# Patient Record
Sex: MALE | Race: WHITE
[De-identification: names, ages, dates, MRNs, and addresses within clinical notes are randomized per-mention and may not be internally consistent; named-entity substitution may affect disease eponyms.]

---

## 2017-02-16 ENCOUNTER — HOSPITAL ENCOUNTER (OUTPATIENT)
Dept: HOSPITAL 61 - PCVCIMAG | Age: 72
Discharge: HOME | End: 2017-02-16
Attending: INTERNAL MEDICINE
Payer: MEDICARE

## 2017-02-16 DIAGNOSIS — E78.00: ICD-10-CM

## 2017-02-16 DIAGNOSIS — I73.9: ICD-10-CM

## 2017-02-16 DIAGNOSIS — G47.33: ICD-10-CM

## 2017-02-16 DIAGNOSIS — I10: Primary | ICD-10-CM

## 2017-02-16 DIAGNOSIS — Z96.0: ICD-10-CM

## 2017-02-16 DIAGNOSIS — I25.10: ICD-10-CM

## 2017-02-16 DIAGNOSIS — I77.9: ICD-10-CM

## 2017-02-16 DIAGNOSIS — J44.9: ICD-10-CM

## 2017-02-16 DIAGNOSIS — I87.2: ICD-10-CM

## 2017-02-16 PROCEDURE — 76770 US EXAM ABDO BACK WALL COMP: CPT

## 2017-02-16 PROCEDURE — 93975 VASCULAR STUDY: CPT

## 2017-02-16 PROCEDURE — G0463 HOSPITAL OUTPT CLINIC VISIT: HCPCS

## 2017-02-16 PROCEDURE — 93005 ELECTROCARDIOGRAM TRACING: CPT

## 2017-09-25 ENCOUNTER — HOSPITAL ENCOUNTER (OUTPATIENT)
Dept: HOSPITAL 61 - PCVCIMAG | Age: 72
Discharge: HOME | End: 2017-09-25
Attending: INTERNAL MEDICINE
Payer: MEDICARE

## 2017-09-25 DIAGNOSIS — N18.9: ICD-10-CM

## 2017-09-25 DIAGNOSIS — Z79.82: ICD-10-CM

## 2017-09-25 DIAGNOSIS — I70.202: Primary | ICD-10-CM

## 2017-09-25 DIAGNOSIS — I77.9: ICD-10-CM

## 2017-09-25 DIAGNOSIS — J44.9: ICD-10-CM

## 2017-09-25 DIAGNOSIS — G89.29: ICD-10-CM

## 2017-09-25 DIAGNOSIS — Z96.0: ICD-10-CM

## 2017-09-25 DIAGNOSIS — I25.10: ICD-10-CM

## 2017-09-25 DIAGNOSIS — Z88.8: ICD-10-CM

## 2017-09-25 DIAGNOSIS — I44.0: ICD-10-CM

## 2017-09-25 DIAGNOSIS — M54.5: ICD-10-CM

## 2017-09-25 DIAGNOSIS — I87.2: ICD-10-CM

## 2017-09-25 DIAGNOSIS — Z87.891: ICD-10-CM

## 2017-09-25 DIAGNOSIS — Z95.828: ICD-10-CM

## 2017-09-25 DIAGNOSIS — I12.9: ICD-10-CM

## 2017-09-25 DIAGNOSIS — I70.291: ICD-10-CM

## 2017-09-25 DIAGNOSIS — Z79.899: ICD-10-CM

## 2017-09-25 PROCEDURE — 80061 LIPID PANEL: CPT

## 2017-09-25 PROCEDURE — G0463 HOSPITAL OUTPT CLINIC VISIT: HCPCS

## 2017-09-25 PROCEDURE — 93975 VASCULAR STUDY: CPT

## 2017-09-25 PROCEDURE — 93925 LOWER EXTREMITY STUDY: CPT

## 2017-09-25 PROCEDURE — 76770 US EXAM ABDO BACK WALL COMP: CPT

## 2017-09-25 PROCEDURE — 93005 ELECTROCARDIOGRAM TRACING: CPT

## 2017-09-25 NOTE — PCVCIMAG
EXAM: BILATERAL RENAL ULTRASOUND AND BILATERAL RENAL DUPLEX



INDICATION: Hypertension



FINDINGS: 

Right kidney: Length measures 10.9 cm. No hydronephrosis or extensive

renal scarring.



Right renal duplex: Adequate technical quality. No sonographic

evidence of renal artery stenosis. The aortic to renal artery ratio is

2.2. The renal vein is patent.



Left kidney: Length measures 12.1 cm. No hydronephrosis or extensive

renal scarring.



Left renal duplex: Adequate technical quality. No sonographic evidence

of renal artery stenosis. The aortic to renal artery ratio is 2.0. The

renal vein is patent.



Bladder: No obvious abnormalities.



IMPRESSION: 

No significant renal artery stenosis.

No hydronephrosis bilaterally.

Previous left renal artery stent maintaining satisfactory patency.



LOC:[id number]

## 2017-09-25 NOTE — PCVCIMAG
EXAM: BILATERAL LOWER EXTREMITY ARTERIAL DUPLEX



INDICATION: Peripheral Arterial Disease. Leg pain.



FINDINGS: 

Right Leg: Satisfactory arterial waveforms in the common femoral and

profunda femoral artery. Elevated systolic velocity at the origin of

the superficial femoral artery of 268 cm/s consistent with 50-60%

stenosis is unchanged. Beyond this a stent is present throughout much

of the length of superficial femoral artery and shows good patency.

The popliteal artery is patent. The anterior tibial, peroneal, and

posterior tibial arteries are patent.    



Left Leg:  Satisfactory arterial waveforms in the common femoral and

profunda femoral artery. Moderate velocity elevation mid/distal native

superficial femoral artery of 287 cm/s consistent with 60-70%

stenosis. Popliteal artery is patent. The anterior tibial, peroneal,

posterior tibial arteries are patent.    



IMPRESSION: 

50-60% stenosis native proximal right superficial femoral artery.

Previous stent throughout the right superficial femoral artery showing

good patency.

60-70% stenosis mid/distal native left superficial femoral artery.



LOC:[id number]

## 2018-04-03 ENCOUNTER — HOSPITAL ENCOUNTER (OUTPATIENT)
Dept: HOSPITAL 61 - PCVCIMAG | Age: 73
Discharge: HOME | End: 2018-04-03
Attending: INTERNAL MEDICINE
Payer: MEDICARE
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
## 2018-04-03 DIAGNOSIS — Z87.891: ICD-10-CM

## 2018-04-03 DIAGNOSIS — E78.00: ICD-10-CM

## 2018-04-03 DIAGNOSIS — I65.23: Primary | ICD-10-CM

## 2018-04-03 DIAGNOSIS — I73.9: ICD-10-CM

## 2018-04-03 DIAGNOSIS — I25.118: ICD-10-CM

## 2018-04-03 DIAGNOSIS — G47.33: ICD-10-CM
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
## 2018-04-03 DIAGNOSIS — S00.83XA: ICD-10-CM

## 2018-04-03 DIAGNOSIS — I10: ICD-10-CM

## 2018-04-03 DIAGNOSIS — R06.02: ICD-10-CM

## 2018-04-03 DIAGNOSIS — Z79.82: ICD-10-CM
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
## 2018-04-03 DIAGNOSIS — R07.89: ICD-10-CM

## 2018-04-03 DIAGNOSIS — Z79.899: ICD-10-CM
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
## 2018-04-03 PROCEDURE — 93005 ELECTROCARDIOGRAM TRACING: CPT

## 2018-04-03 PROCEDURE — 80061 LIPID PANEL: CPT

## 2018-04-03 PROCEDURE — 36415 COLL VENOUS BLD VENIPUNCTURE: CPT

## 2018-04-03 PROCEDURE — 93925 LOWER EXTREMITY STUDY: CPT

## 2018-04-03 PROCEDURE — 93880 EXTRACRANIAL BILAT STUDY: CPT

## 2018-04-12 ENCOUNTER — HOSPITAL ENCOUNTER (OUTPATIENT)
Dept: HOSPITAL 61 - PCVCIMAG | Age: 73
Discharge: HOME | End: 2018-04-12
Attending: INTERNAL MEDICINE
Payer: MEDICARE

## 2018-04-12 DIAGNOSIS — E11.9: ICD-10-CM

## 2018-04-12 DIAGNOSIS — I10: ICD-10-CM

## 2018-04-12 DIAGNOSIS — I70.213: ICD-10-CM

## 2018-04-12 DIAGNOSIS — J44.9: ICD-10-CM

## 2018-04-12 DIAGNOSIS — I25.10: Primary | ICD-10-CM

## 2018-04-12 DIAGNOSIS — Z87.891: ICD-10-CM

## 2018-04-12 PROCEDURE — 93458 L HRT ARTERY/VENTRICLE ANGIO: CPT

## 2018-04-12 PROCEDURE — 37186 SEC ART THROMBECTOMY ADD-ON: CPT

## 2018-04-12 PROCEDURE — 36252 INS CATH REN ART 1ST BILAT: CPT

## 2018-04-12 PROCEDURE — 37227: CPT

## 2018-04-12 PROCEDURE — 99152 MOD SED SAME PHYS/QHP 5/>YRS: CPT

## 2018-04-12 PROCEDURE — 75716 ARTERY X-RAYS ARMS/LEGS: CPT

## 2018-04-12 PROCEDURE — 99153 MOD SED SAME PHYS/QHP EA: CPT

## 2018-07-31 ENCOUNTER — HOSPITAL ENCOUNTER (OUTPATIENT)
Dept: HOSPITAL 61 - PCVCIMAG | Age: 73
Discharge: HOME | End: 2018-07-31
Attending: RADIOLOGY
Payer: MEDICARE

## 2018-07-31 DIAGNOSIS — E78.00: ICD-10-CM

## 2018-07-31 DIAGNOSIS — I73.9: Primary | ICD-10-CM

## 2018-07-31 DIAGNOSIS — I87.2: ICD-10-CM

## 2018-07-31 DIAGNOSIS — Z87.891: ICD-10-CM

## 2018-07-31 DIAGNOSIS — I25.10: ICD-10-CM

## 2018-07-31 DIAGNOSIS — Z79.82: ICD-10-CM

## 2018-07-31 DIAGNOSIS — Z79.899: ICD-10-CM

## 2018-07-31 DIAGNOSIS — I77.9: ICD-10-CM

## 2018-07-31 DIAGNOSIS — J44.9: ICD-10-CM

## 2018-07-31 DIAGNOSIS — N18.9: ICD-10-CM

## 2018-07-31 DIAGNOSIS — I12.9: ICD-10-CM

## 2018-07-31 DIAGNOSIS — Z88.8: ICD-10-CM

## 2018-07-31 PROCEDURE — 93923 UPR/LXTR ART STDY 3+ LVLS: CPT

## 2018-07-31 PROCEDURE — 93925 LOWER EXTREMITY STUDY: CPT

## 2019-01-28 ENCOUNTER — HOSPITAL ENCOUNTER (OUTPATIENT)
Dept: HOSPITAL 61 - PCVCIMAG | Age: 74
Discharge: HOME | End: 2019-01-28
Attending: INTERNAL MEDICINE
Payer: MEDICARE

## 2019-01-28 DIAGNOSIS — G47.33: ICD-10-CM

## 2019-01-28 DIAGNOSIS — Z87.891: ICD-10-CM

## 2019-01-28 DIAGNOSIS — J44.9: ICD-10-CM

## 2019-01-28 DIAGNOSIS — I73.9: ICD-10-CM

## 2019-01-28 DIAGNOSIS — I25.10: ICD-10-CM

## 2019-01-28 DIAGNOSIS — I65.23: Primary | ICD-10-CM

## 2019-01-28 DIAGNOSIS — I10: ICD-10-CM

## 2019-01-28 DIAGNOSIS — E78.00: ICD-10-CM

## 2019-01-28 PROCEDURE — 93005 ELECTROCARDIOGRAM TRACING: CPT

## 2019-01-28 PROCEDURE — 93880 EXTRACRANIAL BILAT STUDY: CPT

## 2019-01-28 PROCEDURE — 36415 COLL VENOUS BLD VENIPUNCTURE: CPT

## 2019-01-28 PROCEDURE — G0463 HOSPITAL OUTPT CLINIC VISIT: HCPCS

## 2019-01-28 PROCEDURE — 93925 LOWER EXTREMITY STUDY: CPT

## 2019-01-28 PROCEDURE — 80061 LIPID PANEL: CPT

## 2019-01-28 NOTE — PCVCIMAG
EXAM: BILATERAL LOWER EXTREMITY ARTERIAL DUPLEX



INDICATION: Peripheral Arterial Disease. Leg pain.



FINDINGS: 

Right Leg: Common femoral and profunda femoral arteries are patent. 

50-60% stenosis proximal superficial femoral artery at the proximal

margin prior stent.  Otherwise superficial femoral artery is patent. 

Popliteal artery is patent.  The anterior tibial, peroneal, and

posterior tibial arteries are patent.    



Left Leg:  Mild stenosis common femoral and profunda femoral arteries.

 40-50% stenosis proximal and mid portions of superficial femoral

artery within prior stent.  The popliteal artery is patent.  The

anterior tibial, peroneal, and posterior tibial arteries are patent.  

 



IMPRESSION: 

50-60% stenosis proximal right superficial femoral artery within prior

stent similar to prior study of July 2018.

40-50% stenosis proximal and mid left superficial femoral artery

within prior stent unchanged since prior study.

   



LOC:[id number]

## 2019-01-28 NOTE — PCVCIMAG
--------------- APPROVED REPORT --------------





Indications

Stenosis



Risk Factors

Hypertension: 

Hyperlipidemia 



Surgery/Intervention

Endarterectomy: right  



Doppler Spectral Velocity Analysis

PSV  /  EDVPSV  /  EDV

ECA (R)     178 / 6 cm/sECA (L)     310 / 23 cm/s



dICA (R)    71 / 19 cm/sdICA (L) 

Gordo (R)     127 / 25 cm/smICA (L) 

pICA (R)     155 / 27 cm/spICA (L)     0 / 0 cm/s



Bulb (R)        145 / 18 cm/sBulb (L)         0 / 0 cm/s



dCCA (R)     112 / 22 cm/sdCCA (L)     84 / 14 cm/s

mCCA (R)    92 / 17 cm/smCCA (L)    92 / 11 cm/s



Vert (R)     82 / 21 cm/sVert (L)     75 / 0 cm/s

ICA/CCA     1.38ICA/CCA     0.00



Basic Measurements

Blood Pressure:                                                 Pulses:               



                                Right           Left                            

RightLeft

Brachial(Sitting)               130/[id number]/62mmHgTemporal        



Real Time B-Mode Imaging

Vert. (R)AntegradeVert. (L)Antegrade



Findings

The right carotid bulb has moderate  plaque.

The right proximal internal carotid artery shows <40% stenosis.

The right common carotid artery shows <40% stenosis.

The right external carotid artery shows <50% stenosis.

The left carotid bulb has severe  plaque.

The left proximal internal carotid artery shows chronic occlusion.

The left common carotid artery shows no significant stenosis.

The left external carotid artery shows >90% stenosis.



Conclusion

1.  Right common and internal carotid artery stenoses (<40%)

2.  Chronic left internal carotid artery occlusion

3.  Antegrade vertebral flow

## 2019-09-18 ENCOUNTER — HOSPITAL ENCOUNTER (OUTPATIENT)
Dept: HOSPITAL 61 - PCVCIMAG | Age: 74
Discharge: HOME | End: 2019-09-18
Attending: INTERNAL MEDICINE
Payer: MEDICARE

## 2019-09-18 DIAGNOSIS — J44.9: ICD-10-CM

## 2019-09-18 DIAGNOSIS — I25.10: ICD-10-CM

## 2019-09-18 DIAGNOSIS — Z87.891: ICD-10-CM

## 2019-09-18 DIAGNOSIS — Z88.8: ICD-10-CM

## 2019-09-18 DIAGNOSIS — E78.00: ICD-10-CM

## 2019-09-18 DIAGNOSIS — I73.9: Primary | ICD-10-CM

## 2019-09-18 DIAGNOSIS — G47.33: ICD-10-CM

## 2019-09-18 DIAGNOSIS — I65.23: ICD-10-CM

## 2019-09-18 DIAGNOSIS — I10: ICD-10-CM

## 2019-09-18 DIAGNOSIS — Z79.899: ICD-10-CM

## 2019-09-18 PROCEDURE — G0463 HOSPITAL OUTPT CLINIC VISIT: HCPCS

## 2019-09-18 PROCEDURE — 93925 LOWER EXTREMITY STUDY: CPT

## 2019-09-18 PROCEDURE — 80061 LIPID PANEL: CPT

## 2019-09-18 PROCEDURE — 93005 ELECTROCARDIOGRAM TRACING: CPT

## 2019-09-18 PROCEDURE — 36415 COLL VENOUS BLD VENIPUNCTURE: CPT

## 2019-09-18 PROCEDURE — 93880 EXTRACRANIAL BILAT STUDY: CPT

## 2019-09-18 NOTE — PCVCIMAG
EXAM: BILATERAL LOWER EXTREMITY ARTERIAL DUPLEX



INDICATION: Peripheral Arterial Disease. Leg pain.



FINDINGS: 

Right Leg: Common femoral profunda femoral arteries are patent. 

Increased systolic velocity 320 cm/s consistent with 50-60% restenosis

proximal superficial femoral artery at the proximal margin of prior

stent.  Otherwise superficial femoral artery stent is patent. 

Popliteal artery is patent.  The anterior tibial, peroneal, and

posterior tibial arteries are patent.



Left Leg:  Common femoral and profunda femoral artery showing mild

stenosis.  50% stenosis proximal native superficial femoral artery and

mid native superficial femoral artery not felt be critically

flow-limiting.  Previous stents proximal and distal superficial

femoral artery are patent.  Popliteal artery is patent.  Anterior and

posterior tibial arteries are patent.  Mid/distal peroneal artery is

occluded.



IMPRESSION: 

50-60% restenosis proximal right superficial femoral artery proximal

margin of prior stent is unchanged.

Otherwise right superficial femoral stent maintaining satisfactory

patency.

50% stenosis proximal and mid native left superficial femoral artery

unchanged since January 2019 study.



Incidental note is made of a 1.4 x 2.5 x 4.5 cm left popliteal cyst.

Unchanged occlusion mid/distal left peroneal artery.



LOC:[id number]